# Patient Record
Sex: FEMALE | Race: WHITE | Employment: PART TIME | URBAN - METROPOLITAN AREA
[De-identification: names, ages, dates, MRNs, and addresses within clinical notes are randomized per-mention and may not be internally consistent; named-entity substitution may affect disease eponyms.]

---

## 2017-03-02 PROCEDURE — 88175 CYTOPATH C/V AUTO FLUID REDO: CPT | Performed by: INTERNAL MEDICINE

## 2018-05-21 PROBLEM — G47.01 INSOMNIA DUE TO MEDICAL CONDITION: Status: ACTIVE | Noted: 2018-05-21

## 2018-05-22 PROCEDURE — 82746 ASSAY OF FOLIC ACID SERUM: CPT | Performed by: INTERNAL MEDICINE

## 2018-05-22 PROCEDURE — 82607 VITAMIN B-12: CPT | Performed by: INTERNAL MEDICINE

## 2018-06-26 PROCEDURE — 86141 C-REACTIVE PROTEIN HS: CPT | Performed by: OTOLARYNGOLOGY

## 2018-07-11 ENCOUNTER — TELEPHONE (OUTPATIENT)
Dept: FAMILY MEDICINE CLINIC | Facility: CLINIC | Age: 63
End: 2018-07-11

## 2018-07-11 DIAGNOSIS — R00.2 PALPITATIONS: Primary | ICD-10-CM

## 2018-07-11 NOTE — TELEPHONE ENCOUNTER
read the 24 hr holter monitor. We will fax results. The interpretation is   1. Underlying rhythm is sinus rhythm. 2. Rare PAC's; mostly single, rare 3 beat run @124 BPM.  3.Rare single PVC's  4.  No pauses or AV block (all artifact)  5.6 entrie

## 2018-07-11 NOTE — TELEPHONE ENCOUNTER
Let pt know overall monitor test was ok however she did have a few premature beats  For this reason, I want her to see cardiology  [phone]  Dr Annabella Mo

## 2018-07-11 NOTE — TELEPHONE ENCOUNTER
Pt returning call  Advised of provider recommendations  Number given for scheduling  Patient verbalized understanding

## 2018-08-27 PROCEDURE — 83883 ASSAY NEPHELOMETRY NOT SPEC: CPT | Performed by: INTERNAL MEDICINE

## 2018-08-27 PROCEDURE — 86038 ANTINUCLEAR ANTIBODIES: CPT | Performed by: INTERNAL MEDICINE

## 2018-08-27 PROCEDURE — 86334 IMMUNOFIX E-PHORESIS SERUM: CPT | Performed by: INTERNAL MEDICINE

## 2018-08-27 PROCEDURE — 84165 PROTEIN E-PHORESIS SERUM: CPT | Performed by: INTERNAL MEDICINE

## 2018-08-27 PROCEDURE — 86160 COMPLEMENT ANTIGEN: CPT | Performed by: INTERNAL MEDICINE

## 2018-09-05 PROBLEM — M15.4 EROSIVE (OSTEO)ARTHRITIS: Status: ACTIVE | Noted: 2018-09-05

## 2018-09-05 PROBLEM — M17.0 BILATERAL PRIMARY OSTEOARTHRITIS OF KNEE: Status: ACTIVE | Noted: 2018-09-05

## 2018-09-05 PROCEDURE — 83876 ASSAY MYELOPEROXIDASE: CPT | Performed by: INTERNAL MEDICINE

## 2018-09-05 PROCEDURE — 86255 FLUORESCENT ANTIBODY SCREEN: CPT | Performed by: INTERNAL MEDICINE
